# Patient Record
Sex: MALE | Race: WHITE | NOT HISPANIC OR LATINO | Employment: OTHER | URBAN - METROPOLITAN AREA
[De-identification: names, ages, dates, MRNs, and addresses within clinical notes are randomized per-mention and may not be internally consistent; named-entity substitution may affect disease eponyms.]

---

## 2017-09-05 ENCOUNTER — ALLSCRIPTS OFFICE VISIT (OUTPATIENT)
Dept: OTHER | Facility: OTHER | Age: 72
End: 2017-09-05

## 2017-09-05 DIAGNOSIS — Z12.5 ENCOUNTER FOR SCREENING FOR MALIGNANT NEOPLASM OF PROSTATE: ICD-10-CM

## 2017-09-05 DIAGNOSIS — Z13.220 ENCOUNTER FOR SCREENING FOR LIPOID DISORDERS: ICD-10-CM

## 2017-09-05 DIAGNOSIS — G25.0 ESSENTIAL TREMOR: ICD-10-CM

## 2017-09-05 DIAGNOSIS — R63.4 ABNORMAL WEIGHT LOSS: ICD-10-CM

## 2017-09-06 ENCOUNTER — TRANSCRIBE ORDERS (OUTPATIENT)
Dept: ADMINISTRATIVE | Facility: HOSPITAL | Age: 72
End: 2017-09-06

## 2017-09-06 ENCOUNTER — APPOINTMENT (OUTPATIENT)
Dept: LAB | Facility: HOSPITAL | Age: 72
End: 2017-09-06
Payer: MEDICARE

## 2017-09-06 DIAGNOSIS — G25.0 ESSENTIAL TREMOR: ICD-10-CM

## 2017-09-06 DIAGNOSIS — Z13.220 ENCOUNTER FOR SCREENING FOR LIPOID DISORDERS: ICD-10-CM

## 2017-09-06 DIAGNOSIS — R63.4 ABNORMAL WEIGHT LOSS: ICD-10-CM

## 2017-09-06 LAB
ALBUMIN SERPL BCP-MCNC: 3.3 G/DL (ref 3.5–5)
ALP SERPL-CCNC: 62 U/L (ref 46–116)
ALT SERPL W P-5'-P-CCNC: 11 U/L (ref 12–78)
ANION GAP SERPL CALCULATED.3IONS-SCNC: 7 MMOL/L (ref 4–13)
AST SERPL W P-5'-P-CCNC: 18 U/L (ref 5–45)
BILIRUB SERPL-MCNC: 0.6 MG/DL (ref 0.2–1)
BUN SERPL-MCNC: 14 MG/DL (ref 5–25)
CALCIUM SERPL-MCNC: 8.4 MG/DL (ref 8.3–10.1)
CHLORIDE SERPL-SCNC: 107 MMOL/L (ref 100–108)
CHOLEST SERPL-MCNC: 178 MG/DL (ref 50–200)
CO2 SERPL-SCNC: 28 MMOL/L (ref 21–32)
CREAT SERPL-MCNC: 1.04 MG/DL (ref 0.6–1.3)
ERYTHROCYTE [DISTWIDTH] IN BLOOD BY AUTOMATED COUNT: 13.8 % (ref 11.6–15.1)
GFR SERPL CREATININE-BSD FRML MDRD: 71 ML/MIN/1.73SQ M
GLUCOSE P FAST SERPL-MCNC: 89 MG/DL (ref 65–99)
HCT VFR BLD AUTO: 45.8 % (ref 36.5–49.3)
HDLC SERPL-MCNC: 45 MG/DL (ref 40–60)
HGB BLD-MCNC: 15.6 G/DL (ref 12–17)
LDLC SERPL CALC-MCNC: 123 MG/DL (ref 0–100)
MCH RBC QN AUTO: 30.8 PG (ref 26.8–34.3)
MCHC RBC AUTO-ENTMCNC: 34.1 G/DL (ref 31.4–37.4)
MCV RBC AUTO: 91 FL (ref 82–98)
PLATELET # BLD AUTO: 168 THOUSANDS/UL (ref 149–390)
PMV BLD AUTO: 10.1 FL (ref 8.9–12.7)
POTASSIUM SERPL-SCNC: 4.1 MMOL/L (ref 3.5–5.3)
PROT SERPL-MCNC: 6.3 G/DL (ref 6.4–8.2)
RBC # BLD AUTO: 5.06 MILLION/UL (ref 3.88–5.62)
SODIUM SERPL-SCNC: 142 MMOL/L (ref 136–145)
TRIGL SERPL-MCNC: 52 MG/DL
TSH SERPL DL<=0.05 MIU/L-ACNC: 2.12 UIU/ML (ref 0.36–3.74)
WBC # BLD AUTO: 8.21 THOUSAND/UL (ref 4.31–10.16)

## 2017-09-06 PROCEDURE — 86618 LYME DISEASE ANTIBODY: CPT

## 2017-09-06 PROCEDURE — 85027 COMPLETE CBC AUTOMATED: CPT

## 2017-09-06 PROCEDURE — 84443 ASSAY THYROID STIM HORMONE: CPT

## 2017-09-06 PROCEDURE — 80061 LIPID PANEL: CPT

## 2017-09-06 PROCEDURE — 36415 COLL VENOUS BLD VENIPUNCTURE: CPT

## 2017-09-06 PROCEDURE — 80053 COMPREHEN METABOLIC PANEL: CPT

## 2017-09-07 LAB
B BURGDOR IGG SER IA-ACNC: 0.23
B BURGDOR IGM SER IA-ACNC: 0.28

## 2017-09-12 ENCOUNTER — GENERIC CONVERSION - ENCOUNTER (OUTPATIENT)
Dept: OTHER | Facility: OTHER | Age: 72
End: 2017-09-12

## 2017-09-19 ENCOUNTER — GENERIC CONVERSION - ENCOUNTER (OUTPATIENT)
Dept: OTHER | Facility: OTHER | Age: 72
End: 2017-09-19

## 2017-09-19 ENCOUNTER — TRANSCRIBE ORDERS (OUTPATIENT)
Dept: ADMINISTRATIVE | Facility: HOSPITAL | Age: 72
End: 2017-09-19

## 2017-09-19 ENCOUNTER — APPOINTMENT (OUTPATIENT)
Dept: LAB | Facility: HOSPITAL | Age: 72
End: 2017-09-19
Payer: MEDICARE

## 2017-09-19 DIAGNOSIS — Z12.5 ENCOUNTER FOR SCREENING FOR MALIGNANT NEOPLASM OF PROSTATE: ICD-10-CM

## 2017-09-19 LAB — PSA SERPL-MCNC: 0.6 NG/ML (ref 0–4)

## 2017-09-19 PROCEDURE — 36415 COLL VENOUS BLD VENIPUNCTURE: CPT

## 2017-09-19 PROCEDURE — G0103 PSA SCREENING: HCPCS

## 2018-01-13 NOTE — RESULT NOTES
Discussion/Summary   All labs were excellent for sugar aned cholesterol  Lyme test was negative  Verified Results  (1) CBC/ PLT (NO DIFF) [id number] 10:32AM Skylar Mota Order Number: [id number]     Test Name Result Flag Reference   HEMATOCRIT 45 8 %  36 5-49 3   HEMOGLOBIN 15 6 g/dL  12 0-17 0   MCHC 34 1 g/dL  31 4-37 4   MCH 30 8 pg  26 8-34 3   MCV 91 fL  82-98   PLATELET COUNT 698 Thousands/uL  149-390   RBC COUNT 5 06 Million/uL  3 88-5 62   RDW 13 8 %  11 6-15 1   WBC COUNT 8 21 Thousand/uL  4 31-10 16   MPV 10 1 fL  8 9-12 7     (1) COMPREHENSIVE METABOLIC PANEL [id number] 24:51HU Skylar Mota Order Number: [id number]     Test Name Result Flag Reference   SODIUM 142 mmol/L  136-145   POTASSIUM 4 1 mmol/L  3 5-5 3   CHLORIDE 107 mmol/L  100-108   CARBON DIOXIDE 28 mmol/L  21-32   ANION GAP (CALC) 7 mmol/L  4-13   BLOOD UREA NITROGEN 14 mg/dL  5-25   CREATININE 1 04 mg/dL  0 60-1 30   Standardized to IDMS reference method   CALCIUM 8 4 mg/dL  8 3-10 1   BILI, TOTAL 0 60 mg/dL  0 20-1 00   ALK PHOSPHATAS 62 U/L     ALT (SGPT) 11 U/L L 12-78   Specimen collection should occur prior to Sulfasalazine administration due to the potential for falsely depressed results  AST(SGOT) 18 U/L  5-45   Specimen collection should occur prior to Sulfasalazine administration due to the potential for falsely depressed results  ALBUMIN 3 3 g/dL L 3 5-5 0   TOTAL PROTEIN 6 3 g/dL L 6 4-8 2   eGFR 71 ml/min/1 73sq m     National Kidney Disease Education Program recommendations are as follows:  GFR calculation is accurate only with a steady state creatinine  Chronic Kidney disease less than 60 ml/min/1 73 sq  meters  Kidney failure less than 15 ml/min/1 73 sq  meters  GLUCOSE FASTING 89 mg/dL  65-99   Specimen collection should occur prior to Sulfasalazine administration due to the potential for falsely depressed results   Specimen collection should occur prior to Sulfapyridine administration due to the potential for falsely elevated results  (1) LYME ANTIBODY PROFILE [facility] TO WESTERN BLOT [id number] 10:32AM Francisca Cevallosradha Order Number: [id number]     Test Name Result Flag Reference   LYME IGG 0 23  0 00-0 79   NEGATIVE(0 00-0 79)-Absence of detectable Borrelia IgG Antibodies  A negative result does not exclude the possibility of Borrelia infection  If early Lyme disease is suspected,a second sample should be collected & tested 4 weeks after initial testing  LYME IGM 0 28  0 00-0 79   NEGATIVE (0 00-0 79)-Absence of detectable Borrelia IgM antibodies  A negative result does not exclude the possibility of Borrelia infection  If early lyme disease is suspected, a second sample should be collected & tested 4 weeks after initial testing      (1) TSH WITH FT4 REFLEX [id number] 10:32AM Francisca Fernandez Order Number: [id number]     Test Name Result Flag Reference   TSH 2 124 uIU/mL  0 358-3 740   Patients undergoing fluorescein dye angiography may retain small amounts of fluorescein in the body for 48-72 hours post procedure  Samples containing fluorescein can produce falsely depressed TSH values  If the patient had this procedure,a specimen should be resubmitted post fluorescein clearance  (1) LIPID PANEL, FASTING [id number] 10:32AM Francisca Fernandez Order Number: [id number]     Test Name Result Flag Reference   CHOLESTEROL 178 mg/dL     HDL,DIRECT 45 mg/dL  40-60   Specimen collection should occur prior to Metamizole administration due to the potential for falsley depressed results  LDL CHOLESTEROL CALCULATED 123 mg/dL H 0-100   Triglyceride:        Normal ??? ??? ??? ??? ??? ??? ??? <150 mg/dl   ??? ??? ???Borderline High ??? ??? 150-199 mg/dl   ??? ??? ? ?? High ??? ??? ??? ??? ??? ??? ??? 200-499 mg/dl   ??? ??? ? ??Very High ??? ??? ??? ??? ??? >499 mg/dl      Cholesterol:       Desirable ??? ??? ??? ??? <200 mg/dl   ??? ??? Borderline High ??? 200-239 mg/dl   ??? ??? High ??? ??? ??? ??? ??? ??? >239 mg/dl      HDL Cholesterol:       High ??? ???>59 mg/dL   ??? ??? Low ??? ??? <41 mg/dL      This screening LDL is a calculated result  It does not have the accuracy of the Direct Measured LDL in the monitoring of patients with hyperlipidemia and/or statin therapy  Direct Measure LDL (LZO130) must be ordered separately in these patients  TRIGLYCERIDES 52 mg/dL  <=150   Specimen collection should occur prior to N-Acetylcysteine or Metamizole administration due to the potential for falsely depressed results          Pt 's wife OK>

## 2018-01-14 VITALS
WEIGHT: 148.2 LBS | SYSTOLIC BLOOD PRESSURE: 132 MMHG | TEMPERATURE: 98.2 F | HEART RATE: 78 BPM | DIASTOLIC BLOOD PRESSURE: 72 MMHG | BODY MASS INDEX: 22.46 KG/M2 | HEIGHT: 68 IN

## 2018-01-15 NOTE — RESULT NOTES
Discussion/Summary   PSA is excellent  Verified Results  (1) PSA (SCREEN) (Dx V76 44 Screen for Prostate Cancer) [id number] 02:07PM Steve Mcclendon Order Number: [id number]     Test Name Result Flag Reference   PROSTATE SPECIFIC ANTIGEN 0 6 ng/mL  0 0-4 0   American Urological Association Guidelines define biochemical recurrence of prostate cancer as a detectable or rising PSA value post-radical prostatectomy that is greater than or equal to 0 2 ng/mL with a second confirmatory level of greater than or equal to 0 2 ng/mL            Patients wife notified of results

## 2018-01-16 NOTE — PROGRESS NOTES
Assessment    1  Encounter for preventive health examination (V70 0) (Z00 00)   2  Benign familial tremor (333 1) (G25 0)   3  Tick bite, initial encounter (919 4,E906 4) (W57 XXXA)   4  Abnormal loss of weight (783 21) (R63 4)    Plan      Encounter for screening colonoscopy    · 2 - Jaci Mcrae MD, Michelle Pemberton  (Gastroenterology) Co-Management  *  Status: Active  Requested  for: 61Iov0247  Care Summary provided  : Yes     Health Maintenance    · *VB - Fall Risk Assessment  (Dx Z13 89 Screen for Neurologic Disorder);  Status:Complete;   Done: 91MWM9090 12:00AM   · *VB - Urinary Incontinence Screen (Dx Z13 89 Screen for UI); Status:Complete;   Done:  72XDV2396 12:00AM   · Follow-up visit in 1 year Evaluation and Treatment  Follow-up  Status: Hold For -  Scheduling  Requested for: 19XJB0998   · Stretch and warm up your muscles during the first 10 minutes , then cool down your  muscles for the last 10 minutes of exercise ; Status:Complete;   Done: 37USK7177   · The plan of care for urinary incontinence is detailed in the plan and/or discussion section  of today's note ; Status:Complete;   Done: 62QXJ7328   · These are things you can do to prevent falls in and around the home ; Status:Complete;    Done: 12Oct2017   · We recommend that you create an advance directive ; Status:Complete;   Done:  12Oct2017    (1) CBC/ PLT (NO DIFF); Status:Resulted - Requires Verification;   Done: 23YNT4849 12:00AM  ADJ:81HKJ4845; Ordered; For:Benign familial tremor; Ordered By:Osman Vega;   (1) COMPREHENSIVE METABOLIC PANEL; Status:Resulted - Requires Verification;   Done: 66OXB5282 12:00AM  Due:40Dzg3067; Ordered; For:Benign familial tremor; Ordered By:Osman Vega;   (1) LYME ANTIBODY PROFILE W/REFLEX TO WESTERN BLOT; Status:Resulted - Requires Verification;   Done: 73LFA0528 12:00AM  Due:36Fli8181; Ordered; For:Screening for hyperlipidemia; Ordered By:Vishal Vega;       Annotations              ick  (1) TSH WITH FT4 REFLEX; Status:Resulted - Requires Verification;   Done: 57SBA7548 12:00AM  GZE:11QGF0245; Ordered; For:Benign familial tremor; Ordered By:Osman Vega;   (1) LIPID PANEL, FASTING; Status:Resulted - Requires Verification;   Done: 34QUX8879 12:00AM  Due:80Uiu8720; Ordered; For:Abnormal loss of weight; Ordered By:Osman Vega;      Discussion/Summary  Impression: Initial Annual Wellness Visit  Immunizations: the patient declines the influenza vaccination, the patient declines the pneumococcal vaccination, hepatitis B vaccination series is not indicated at this time due to the patient's low risk of andrew the disease, the patient declines the Zostavax vaccine, the patient declines the Td vaccine and the patient declines the Tdap vaccine  Advance Directive Planning: not complete  Advice and education were given regarding fall risk reduction  He was referred to none  Medical Equipment/Suppliers: none  Patient Discussion: plan discussed with the patient, follow-up visit needed in one year  Self Referrals: No   Possible side effects of new medications were reviewed with the patient/guardian today  The treatment plan was reviewed with the patient/guardian  The patient/guardian understands and agrees with the treatment plan      Chief Complaint  HM      Advance Directives  Advance Directive [facility]:   The patient is not in agreement to receive the Advance Care Planning service    NO - Patient does not have an advance health care directive  Capacity/Competence: This patient has full decision making capacity for discussion of advance care planning and This patient has full decision making competency for discussion of advance care planning  History of Present Illness  HPI: Pulled a tick off of L ankle about a month ago that had a bulls eye with it  No recent illness or injury   Welcome to Medicare and Wellness Visits: The patient is being seen for the initial annual wellness visit     Medicare Screening and Risk Factors   Hospitalizations: no previous hospitalizations  Medicare Screening Tests Risk Questions   Abdominal aortic aneurysm risk assessment: none indicated  Osteoporosis risk assessment: none indicated  HIV risk assessment: none indicated  Drug and Alcohol Use: The patient is a current cigarette smoker  The patient reports occasional alcohol use  He has never used illicit drugs  Diet and Physical Activity: Current diet includes well balanced meals  He exercises daily  Exercise: walking  Mood Disorder and Cognitive Impairment Screening:   Depression screening  negative for symptoms  He denies feeling down, depressed, or hopeless over the past two weeks  He denies feeling little interest or pleasure in doing things over the past two weeks  Cognitive impairment screening: denies difficulty learning/retaining new information, denies difficulty handling complex tasks, denies difficulty with reasoning, denies difficulty with spatial ability and orientation, denies difficulty with language and denies difficulty with behavior  Functional Ability/Level of Safety: Hearing is normal bilaterally  He does not use a hearing aid  The patient is currently able to do activities of daily living without limitations, able to do instrumental activities of daily living without limitations, able to participate in social activities without limitations and able to drive without limitations  Activities of daily living details: does not need help using the phone, no transportation help needed, does not need help shopping, no meal preparation help needed, does not need help doing housework, does not need help doing laundry, does not need help managing medications and does not need help managing money     Injury History: no polypharmacy, no alcohol use, no mobility impairment, no antidepressant use, no deconditioning, no postural hypotension, no sedative use, no visual impairment, no urinary incontinence, no antihypertensive use, no cognitive impairment, up and go test was normal and no previous fall  Home safety risk factors:  no unfamiliar surroundings, no loose rugs, no poor household lighting, no uneven floors, no household clutter, grab bars in the bathroom and handrails on the stairs  Advance Directives: Advance directives: no living will, no durable power of  for health care directives and no advance directives  Co-Managers and Medical Equipment/Suppliers: See Patient Care Team   Preventive Quality Program 65 and Older: Falls Risk: The patient fell 0 times in the past 12 months  The patient is currently asymptomatic Symptoms Include:  Associated symptoms:  No associated symptoms are reported  The patient currently has no urinary incontinence symptoms  Review of Systems    Constitutional: no fever  ENT: no nasal congestion  Cardiovascular: no chest pain and no lower extremity edema  Respiratory: no shortness of breath and no wheezing  Gastrointestinal: no abdominal pain and no vomiting  Genitourinary: no dysuria  Musculoskeletal: no diffuse joint pain  Integumentary and Breasts: no rashes and no edema  Neurological: no headache  Psychiatric: no insomnia, no anxiety and no depression  Endocrine: no muscle weakness  Hematologic and Lymphatic: no tendency for easy bruising  Active Problems    1  Abdominal aortic ectasia (447 72) (I77 811)   2  Abnormal loss of weight (783 21) (R63 4)   3  Acute bronchitis (466 0) (J20 9)   4  Anorexia (783 0) (R63 0)   5  Benign familial tremor (333 1) (G25 0)   6  Community acquired pneumonia (5) (J18 9)   7  Encounter for screening colonoscopy (V76 51) (Z12 11)   8  Erectile dysfunction of non-organic origin (302 72) (F52 21)   9  Pleurisy (511 0) (R09 1)   10  Prostatic hyperplasia (600 90) (N40 0)   11   Pulmonary nodule seen on imaging study (793 11) (R91 1)    Past Medical History    · Denied: History of Alcohol abuse   · Denied: History of substance abuse   · Denied: History of Mental health problem    The active problems and past medical history were reviewed and updated today  Family History     Mother    · Denied: Family history of Alcohol abuse   · Denied: Family history of substance abuse   · Denied: Family history of Mental health problem     Father    · Denied: Family history of Alcohol abuse   · Denied: Family history of substance abuse   · Denied: Family history of Mental health problem    The family history was reviewed and updated today  Social History    · Alcohol Use (History)   · Being A Social Drinker   · Current Every Day Smoker (305 1)   · Marital History - Currently   The social history was reviewed and updated today  The social history was reviewed and is unchanged  Allergies    1  No Known Drug Allergies    Vitals  Signs    Temperature: 98 2 F, TympanicHeart Rate: 78, L RadialPulse Quality: Regular, L RadialSystolic: 496, LUE, SittingDiastolic: 72, LUE, SittingHeight: 5 ft 8 inWeight: 148 lb 3 2 ozBMI Calculated: 22 53BSA Calculated: 1 8    Physical Exam    Constitutional   General appearance: No acute distress, well appearing and well nourished  Eyes   Conjunctiva and lids: No erythema, swelling or discharge  Ears, Nose, Mouth, and Throat   External inspection of ears and nose: Normal     Otoscopic examination: Tympanic membranes translucent with normal light reflex  Canals patent without erythema  Hearing: Normal     Nasal mucosa, septum, and turbinates: Normal without edema or erythema  Lips, teeth, and gums: Normal, good dentition  Oropharynx: Normal with no erythema, edema, exudate or lesions  Neck   Neck: Supple, symmetric, trachea midline, no masses  Thyroid: Normal, no thyromegaly  Pulmonary   Respiratory effort: No increased work of breathing or signs of respiratory distress  Auscultation of lungs: Clear to auscultation      Cardiovascular   Auscultation of heart: Normal rate and rhythm, normal S1 and S2, no murmurs  Carotid pulses: 2+ bilaterally  Abdominal aorta: Normal     Peripheral vascular exam: Normal     Examination of extremities for edema and/or varicosities: Normal     Chest   Chest: Normal     Abdomen   Abdomen: Non-tender, no masses  Liver and spleen: No hepatomegaly or splenomegaly  Lymphatic   Palpation of lymph nodes in neck: No lymphadenopathy  Musculoskeletal   Gait and station: Normal     Inspection/palpation of joints, bones, and muscles: Normal     Range of motion: Normal     Skin   Skin and subcutaneous tissue: Normal without rashes or lesions  Neurologic   Reflexes: 2+ and symmetric  Psychiatric   Judgment and insight: Normal     Orientation to person, place and time: Normal     Recent and remote memory: Intact  Mood and affect: Normal        Results/Data  PHQ-9 Adolescent Depression Screening 17Ftw6056 03:54PM [facility]     Test Name Result Flag Reference   PHQ-9 Adolescent Depression Score 1     Over the last two weeks, how often have you been bothered by any of the following problems? Little interest or pleasure in doing things: Not at all - 0  Feeling down, depressed, or hopeless: Not at all - 0  Trouble falling or staying asleep, or sleeping too much: Not at all - 0  Feeling tired or having little energy: Several days - 1  Poor appetite or over eating: Not at all - 0  Feeling bad about yourself - or that you are a failure or have let yourself or your family down: Not at all - 0  Trouble concentrating on things, such as reading the newspaper or watching television: Not at all - 0  Moving or speaking so slowly that other people could have noticed   Or the opposite -  being so fidgety or restless that you have been moving around a lot more than usual: Not at all - 0  Thoughts that you would be better off dead, or of hurting yourself in some way: Not at all - 0   PHQ-9 Adolescent Depression Screening Negative PHQ-9 Difficulty Level Not difficult at all     PHQ-9 Severity Minimal Depression         Procedure    Procedure: Visual Acuity Test    Indication: routine screening  Inforrmation supplied by a Snellen chart     Results: 20/70 in the right eye without corrective device, 20/50 in the left eye without corrective device      Signatures   Electronically signed by : Najma Puente MD; Oct 12 2017  4:32PM EST                       (Author)

## 2018-01-16 NOTE — MISCELLANEOUS
Message   Recorded as Task   Date: 09/18/2017 09:49 AM, Created By: Susanne Mills   Task Name: Follow Up   Assigned To: [address]   Regarding Patient: Abdon Rankin, Status: Active   Comment:    Fartun Flynn - 18 Sep 2017 9:49 AM     TASK CREATED  CallerEnos Moritz; General Medical Question; [phone]; [phone]  Looking for his recent PSA results   Cathie Ag - 18 Sep 2017 10:19 AM     TASK REASSIGNED: Previously Assigned To [address] like they were ordered, but I do not see them documented anywhere in [employer] or in Epic  Osman Vega - 19 Sep 2017 6:57 AM     TASK REPLIED TO: Previously Assigned To Osman Vega  PSA was not done for whatever reason  He can use order on printer to get nonfasting PSA done  Cathie Ag - 19 Sep 2017 8:49 AM     TASK EDITED  Patients wife aware        Active Problems    1  Abdominal aortic ectasia (447 72) (I77 811)   2  Abnormal loss of weight (783 21) (R63 4)   3  Acute bronchitis (466 0) (J20 9)   4  Anorexia (783 0) (R63 0)   5  Benign familial tremor (333 1) (G25 0)   6  Community acquired pneumonia (5) (J18 9)   7  Encounter for prostate cancer screening (V76 44) (Z12 5)   8  Encounter for screening colonoscopy (V76 51) (Z12 11)   9  Erectile dysfunction of non-organic origin (302 72) (F52 21)   10  Pleurisy (511 0) (R09 1)   11  Prostatic hyperplasia (600 90) (N40 0)   12  Pulmonary nodule seen on imaging study (793 11) (R91 1)   13  Screening for hyperlipidemia (V77 91) (Z13 220)   14  Tick bite, initial encounter (919 4,E906 4) (W57 XXXA)    Allergies    1   No Known Drug Allergies    Signatures   Electronically signed by : Case Torres, ; Sep 19 2017  8:50AM EST                       (Author)

## 2018-08-30 ENCOUNTER — OFFICE VISIT (OUTPATIENT)
Dept: URGENT CARE | Facility: CLINIC | Age: 73
End: 2018-08-30
Payer: MEDICARE

## 2018-08-30 VITALS
OXYGEN SATURATION: 98 % | TEMPERATURE: 97.3 F | HEIGHT: 70 IN | BODY MASS INDEX: 20.41 KG/M2 | DIASTOLIC BLOOD PRESSURE: 74 MMHG | RESPIRATION RATE: 16 BRPM | HEART RATE: 75 BPM | WEIGHT: 142.6 LBS | SYSTOLIC BLOOD PRESSURE: 139 MMHG

## 2018-08-30 DIAGNOSIS — L23.7 POISON IVY DERMATITIS: Primary | ICD-10-CM

## 2018-08-30 PROCEDURE — G0463 HOSPITAL OUTPT CLINIC VISIT: HCPCS | Performed by: PHYSICIAN ASSISTANT

## 2018-08-30 PROCEDURE — 99213 OFFICE O/P EST LOW 20 MIN: CPT | Performed by: PHYSICIAN ASSISTANT

## 2018-08-30 RX ORDER — PREDNISONE 10 MG/1
TABLET ORAL
Qty: 21 TABLET | Refills: 0 | Status: SHIPPED | OUTPATIENT
Start: 2018-08-30 | End: 2019-03-20

## 2018-08-30 NOTE — PATIENT INSTRUCTIONS
Take prednisone taper as directed with food  Wash oils off of skin thoroughly after coming into contact with poison ivy  Take benadryl at bedtime as needed for itching  Cool compresses may help soothe the itching  Recheck with PCP if no improvement in 5-7 days    Poison Ivy   WHAT YOU NEED TO KNOW:   Poison ivy is a plant that can cause an itchy, uncomfortable rash on your skin  Poison ivy grows as a shrub or vine in woods, fields, and areas of thick [place]  It has 3 bright green leaves on each stem that turn red in [place]  DISCHARGE INSTRUCTIONS:   Medicines:   · Antiseptic or drying creams or ointments: These medicines may be used to dry out the rash and decrease the itching  These products may be available without a doctor's order  · Steroids: This medicine helps decrease itching and inflammation  It can be given as a cream to apply to your skin or as a pill  · Antihistamines: This medicine may help decrease itching and help you sleep  It is available without a doctor's order  · Take your medicine as directed  Contact your healthcare provider if you think your medicine is not helping or if you have side effects  Tell him of her if you are allergic to any medicine  Keep a list of the medicines, vitamins, and herbs you take  Include the amounts, and when and why you take them  Bring the list or the pill bottles to follow-up visits  Carry your medicine list with you in case of an emergency  Follow up with your healthcare provider as directed:  Write down your questions so you remember to ask them during your visits  How your poison ivy rash spreads: You cannot spread poison ivy by touching your rash or the liquid from your blisters  Poison ivy is spread only if you scratch your skin while it still has oil on it  You may think your rash is spreading because new rashes appear over a number of days   This happens because areas covered by thin skin break out in a rash first  Your face or forearms may develop a rash before thicker areas, such as the palms of your hands  Self-care:   · Keep your rash clean and dry:  Wash it with soap and water  Gently pat it dry with a clean towel  · Try not to scratch or rub your rash: This can cause your skin to become infected  · Use a compress on your rash:  Dip a clean washcloth in cool water  Wring it out and place it on your rash  Leave the washcloth on your skin for 15 minutes  Do this at least 3 times per day  · Take a cornstarch or oatmeal bath: If your rash is too large to cover with wet washcloths, take 3 or 4 cornstarch baths daily  Mix 1 pound of cornstarch with a little water to make a paste  Add the paste to a tub full of water and mix well  You may also use colloidal oatmeal in the bath water  Use lukewarm water  Avoid hot water because it may cause your itching to increase  Prevent a poison ivy rash in the future:   · Wear skin protection:  Wear long pants, a long-sleeved shirt, and gloves  Use a skin block lotion to protect your skin from poison ivy oil  You can find this at a drugstore without a prescription  · Wash clothing after possible exposure: If you think you have been near a poison ivy plant, wash the clothes you were wearing separately from other clothes  Rinse the washing machine well after you take the clothes out  Scrub boots and shoes with warm, soapy water  Dry clean items and clothing that you cannot wash in water  Poison ivy oil is sticky and can stay on surfaces for a long time  It can cause a new rash even years later  · Bathe your pet:  Use warm water and shampoo on your pet's fur  This will prevent the spread of oil to your skin, car, and home  Wear long sleeves, long pants, and gloves while washing pets or any items that may have oil on them  · Reduce exposure to poison ivy:  Do not touch plants that look like poison ivy  Keep your yard free of poison ivy   While protecting your skin, remove the plant and the roots  Place them in a plastic bag and seal the bag tightly  · Do not burn poison ivy plants: This can spread the oil through the air  If you breathe the oil into your lungs, you could have swelling and serious breathing problems  Oil that clings to the fire brenda can land on your skin and cause a rash  Contact your healthcare provider if:   · You have pus, soft yellow scabs, or tenderness on the rash  · The itching gets worse or keeps you awake at night  · The rash covers more than 1/4 of your skin or spreads to your eyes, mouth, or genital area  · The rash is not better after 2 to 3 weeks  · You have tender, swollen glands on the sides of your neck  · You have swelling in your arms and legs  · You have questions or concerns about your condition or care  Return to the emergency department if:   · You have a fever  · You have redness, swelling, and tenderness around the rash  · You have trouble breathing  © 2017 [employer]  Information is for End User's use only and may not be sold, redistributed or otherwise used for commercial purposes  All illustrations and images included in CareNotes® are the copyrighted property of [employer]  or [employer]  The above information is an  only  It is not intended as medical advice for individual conditions or treatments  Talk to your doctor, nurse or pharmacist before following any medical regimen to see if it is safe and effective for you

## 2018-08-30 NOTE — PROGRESS NOTES
[facility]        NAME: Lillie Jackson is a 68 y o  male  : 1945    MRN: [id number]  DATE: 2018  TIME: 5:37 PM    Assessment and Plan   Poison ivy dermatitis [L23 7]  1  Poison ivy dermatitis  predniSONE 10 mg tablet     Patient Instructions   Take prednisone taper as directed with food  Wash oils off of skin thoroughly after coming into contact with poison ivy  Take benadryl at bedtime as needed for itching  Cool compresses may help soothe the itching  Recheck with PCP if no improvement in 5-7 days    Chief Complaint     Chief Complaint   Patient presents with    Rash     started yesterday, working in the yard, bushes, trees, pt reports rash is possible poison ivy         History of Present Illness       67 y/o  Male was pulling weeds in his backyard yesterday then noticed an itchy rash of his arms afterwards  Rash is also on the back of his neck, and he feels itchiness above his left eye  Review of Systems   Review of Systems   Constitutional: Negative  Respiratory: Negative  Cardiovascular: Negative  Skin: Positive for rash  All other systems reviewed and are negative  Current Medications       Current Outpatient Prescriptions:     predniSONE 10 mg tablet, Day 1: take 6; day 2: take 5; day 3: take 4; day 4: take 3; day 5: take 2; Day 6: take 1 with food, Disp: 21 tablet, Rfl: 0    Current Allergies     Allergies as of 2018    (No Known Allergies)            The following portions of the patient's history were reviewed and updated as appropriate: allergies, current medications, past family history, past medical history, past social history, past surgical history and problem list    History reviewed  No pertinent past medical history    Past Surgical History:   Procedure Laterality Date    FINGER SURGERY             Objective   /74   Pulse 75   Temp (!) 97 3 °F (36 3 °C) (Tympanic)   Resp 16   Ht 5' 10" (1 778 m)   Wt 64 7 kg (142 lb 9 6 oz) SpO2 98%   BMI 20 46 kg/m²        Physical Exam     Physical Exam   Constitutional: He is oriented to person, place, and time  He appears well-developed and well-nourished  Neurological: He is alert and oriented to person, place, and time  Skin:   Scattered blotches of erythematous, maculopapular rash with pinpoint blisters noted on b/l arms and posterior aspect of neck  Psychiatric: He has a normal mood and affect  His behavior is normal    Nursing note and vitals reviewed

## 2019-03-20 ENCOUNTER — OFFICE VISIT (OUTPATIENT)
Dept: FAMILY MEDICINE CLINIC | Facility: HOSPITAL | Age: 74
End: 2019-03-20
Payer: MEDICARE

## 2019-03-20 VITALS
BODY MASS INDEX: 22.82 KG/M2 | TEMPERATURE: 96.4 F | HEART RATE: 65 BPM | WEIGHT: 145.4 LBS | SYSTOLIC BLOOD PRESSURE: 124 MMHG | HEIGHT: 67 IN | DIASTOLIC BLOOD PRESSURE: 70 MMHG | OXYGEN SATURATION: 96 %

## 2019-03-20 DIAGNOSIS — R39.198 SLOWING OF URINARY STREAM: Primary | ICD-10-CM

## 2019-03-20 PROCEDURE — 99213 OFFICE O/P EST LOW 20 MIN: CPT | Performed by: FAMILY MEDICINE

## 2019-03-20 RX ORDER — TAMSULOSIN HYDROCHLORIDE 0.4 MG/1
0.4 CAPSULE ORAL
Qty: 15 CAPSULE | Refills: 1 | Status: SHIPPED | OUTPATIENT
Start: 2019-03-20

## 2019-03-20 NOTE — PROGRESS NOTES
Assessment/Plan:         Diagnoses and all orders for this visit:    Slowing of urinary stream  Comments:  Low normal PSA at last check  Consider urethral stenosis  If no benefit with Tamsulosin, needs Urology evaluation with cystoscopy  Orders:  -     tamsulosin (FLOMAX) 0 4 mg; Take 1 capsule (0 4 mg total) by mouth daily with dinner          Subjective:      Patient ID: Shalom Harper is a 68 y o  male  Having slow stream over the past year  Urine stream is very narrowed    Never feels like he is empty  Nocturia usually once a night    No dysuria and no pain    Bowels are sometimes constipated      The following portions of the patient's history were reviewed and updated as appropriate: allergies, current medications, past family history, past medical history, past social history, past surgical history and problem list     Review of Systems   Respiratory: Negative  Cardiovascular: Negative  Genitourinary: Positive for difficulty urinating, frequency and urgency  Negative for decreased urine volume, scrotal swelling and testicular pain  Objective:      /70 (Patient Position: Sitting, Cuff Size: Standard)   Pulse 65   Temp (!) 96 4 °F (35 8 °C) (Tympanic)   Ht 5' 7" (1 702 m)   Wt 66 kg (145 lb 6 4 oz)   SpO2 96%   BMI 22 77 kg/m²          Physical Exam   Constitutional: He is oriented to person, place, and time  Cardiovascular: Normal rate, regular rhythm, normal heart sounds and intact distal pulses  Pulmonary/Chest: Effort normal and breath sounds normal    Musculoskeletal: Normal range of motion  Neurological: He is oriented to person, place, and time  Skin: No rash noted

## 2020-07-27 ENCOUNTER — TELEPHONE (OUTPATIENT)
Dept: FAMILY MEDICINE CLINIC | Facility: HOSPITAL | Age: 75
End: 2020-07-27

## 2020-07-28 ENCOUNTER — TELEPHONE (OUTPATIENT)
Dept: FAMILY MEDICINE CLINIC | Facility: HOSPITAL | Age: 75
End: 2020-07-28

## 2020-07-28 PROBLEM — R63.4 WEIGHT LOSS, UNINTENTIONAL: Status: ACTIVE | Noted: 2020-07-28

## 2020-07-28 PROBLEM — G44.52 NEW DAILY PERSISTENT HEADACHE: Status: ACTIVE | Noted: 2020-07-28

## 2020-07-28 PROBLEM — R42 VERTIGO: Status: ACTIVE | Noted: 2020-07-28

## 2020-07-29 ENCOUNTER — APPOINTMENT (OUTPATIENT)
Dept: LAB | Facility: HOSPITAL | Age: 75
End: 2020-07-29
Payer: COMMERCIAL

## 2020-07-29 DIAGNOSIS — R63.4 WEIGHT LOSS, UNINTENTIONAL: ICD-10-CM

## 2020-07-29 DIAGNOSIS — R42 VERTIGO: ICD-10-CM

## 2020-07-29 LAB
ALBUMIN SERPL BCP-MCNC: 3.1 G/DL (ref 3.5–5)
ALP SERPL-CCNC: 75 U/L (ref 46–116)
ALT SERPL W P-5'-P-CCNC: 15 U/L (ref 12–78)
ANION GAP SERPL CALCULATED.3IONS-SCNC: 4 MMOL/L (ref 4–13)
AST SERPL W P-5'-P-CCNC: 18 U/L (ref 5–45)
BASOPHILS # BLD AUTO: 0.07 THOUSANDS/ΜL (ref 0–0.1)
BASOPHILS NFR BLD AUTO: 1 % (ref 0–1)
BILIRUB SERPL-MCNC: 0.43 MG/DL (ref 0.2–1)
BUN SERPL-MCNC: 20 MG/DL (ref 5–25)
CALCIUM SERPL-MCNC: 9.2 MG/DL (ref 8.3–10.1)
CHLORIDE SERPL-SCNC: 107 MMOL/L (ref 100–108)
CO2 SERPL-SCNC: 29 MMOL/L (ref 21–32)
CREAT SERPL-MCNC: 1.03 MG/DL (ref 0.6–1.3)
EOSINOPHIL # BLD AUTO: 0.38 THOUSAND/ΜL (ref 0–0.61)
EOSINOPHIL NFR BLD AUTO: 4 % (ref 0–6)
ERYTHROCYTE [DISTWIDTH] IN BLOOD BY AUTOMATED COUNT: 12 % (ref 11.6–15.1)
GFR SERPL CREATININE-BSD FRML MDRD: 71 ML/MIN/1.73SQ M
GLUCOSE SERPL-MCNC: 73 MG/DL (ref 65–140)
HCT VFR BLD AUTO: 44 % (ref 36.5–49.3)
HGB BLD-MCNC: 14.7 G/DL (ref 12–17)
IMM GRANULOCYTES # BLD AUTO: 0.03 THOUSAND/UL (ref 0–0.2)
IMM GRANULOCYTES NFR BLD AUTO: 0 % (ref 0–2)
LYMPHOCYTES # BLD AUTO: 2.55 THOUSANDS/ΜL (ref 0.6–4.47)
LYMPHOCYTES NFR BLD AUTO: 26 % (ref 14–44)
MCH RBC QN AUTO: 30.9 PG (ref 26.8–34.3)
MCHC RBC AUTO-ENTMCNC: 33.4 G/DL (ref 31.4–37.4)
MCV RBC AUTO: 93 FL (ref 82–98)
MONOCYTES # BLD AUTO: 0.59 THOUSAND/ΜL (ref 0.17–1.22)
MONOCYTES NFR BLD AUTO: 6 % (ref 4–12)
NEUTROPHILS # BLD AUTO: 6.1 THOUSANDS/ΜL (ref 1.85–7.62)
NEUTS SEG NFR BLD AUTO: 63 % (ref 43–75)
NRBC BLD AUTO-RTO: 0 /100 WBCS
PLATELET # BLD AUTO: 216 THOUSANDS/UL (ref 149–390)
PMV BLD AUTO: 11.6 FL (ref 8.9–12.7)
POTASSIUM SERPL-SCNC: 4.2 MMOL/L (ref 3.5–5.3)
PROT SERPL-MCNC: 6.7 G/DL (ref 6.4–8.2)
RBC # BLD AUTO: 4.75 MILLION/UL (ref 3.88–5.62)
SODIUM SERPL-SCNC: 140 MMOL/L (ref 136–145)
TSH SERPL DL<=0.05 MIU/L-ACNC: 2.36 UIU/ML (ref 0.36–3.74)
WBC # BLD AUTO: 9.72 THOUSAND/UL (ref 4.31–10.16)

## 2020-07-29 PROCEDURE — 84443 ASSAY THYROID STIM HORMONE: CPT

## 2020-07-29 PROCEDURE — 36415 COLL VENOUS BLD VENIPUNCTURE: CPT

## 2020-07-29 PROCEDURE — 85025 COMPLETE CBC W/AUTO DIFF WBC: CPT

## 2020-07-29 PROCEDURE — 86618 LYME DISEASE ANTIBODY: CPT

## 2020-07-29 PROCEDURE — 80053 COMPREHEN METABOLIC PANEL: CPT

## 2020-07-30 LAB — B BURGDOR IGG+IGM SER-ACNC: <0.91 ISR (ref 0–0.9)

## 2020-07-31 ENCOUNTER — TELEPHONE (OUTPATIENT)
Dept: FAMILY MEDICINE CLINIC | Facility: HOSPITAL | Age: 75
End: 2020-07-31

## 2020-07-31 NOTE — TELEPHONE ENCOUNTER
All labs excellent for sugar, liver, kidney, thyroid and negative lyme  No anemia or signs of low iron  Protein is a little low so the supplements with protein are good  (Ensure,Boost,etc)

## 2020-08-02 ENCOUNTER — APPOINTMENT (EMERGENCY)
Dept: CT IMAGING | Facility: HOSPITAL | Age: 75
End: 2020-08-02
Payer: COMMERCIAL

## 2020-08-02 ENCOUNTER — NURSE TRIAGE (OUTPATIENT)
Dept: OTHER | Facility: OTHER | Age: 75
End: 2020-08-02

## 2020-08-02 ENCOUNTER — HOSPITAL ENCOUNTER (EMERGENCY)
Facility: HOSPITAL | Age: 75
Discharge: HOME | End: 2020-08-02
Attending: EMERGENCY MEDICINE | Admitting: EMERGENCY MEDICINE
Payer: COMMERCIAL

## 2020-08-02 VITALS
WEIGHT: 135 LBS | SYSTOLIC BLOOD PRESSURE: 125 MMHG | HEIGHT: 67 IN | BODY MASS INDEX: 21.19 KG/M2 | OXYGEN SATURATION: 98 % | RESPIRATION RATE: 20 BRPM | TEMPERATURE: 97.5 F | HEART RATE: 69 BPM | DIASTOLIC BLOOD PRESSURE: 63 MMHG

## 2020-08-02 DIAGNOSIS — C34.92 PRIMARY MALIGNANT NEOPLASM OF LEFT LUNG METASTATIC TO OTHER SITE (HCC): Primary | ICD-10-CM

## 2020-08-02 DIAGNOSIS — R27.0 ATAXIA: ICD-10-CM

## 2020-08-02 DIAGNOSIS — C79.31 BRAIN METASTASES (HCC): ICD-10-CM

## 2020-08-02 LAB
ANION GAP SERPL CALCULATED.3IONS-SCNC: 5 MMOL/L (ref 4–13)
BASOPHILS # BLD AUTO: 0.04 THOUSANDS/ΜL (ref 0–0.1)
BASOPHILS NFR BLD AUTO: 1 % (ref 0–1)
BUN SERPL-MCNC: 22 MG/DL (ref 5–25)
CALCIUM SERPL-MCNC: 8.8 MG/DL (ref 8.3–10.1)
CHLORIDE SERPL-SCNC: 104 MMOL/L (ref 100–108)
CO2 SERPL-SCNC: 30 MMOL/L (ref 21–32)
CREAT SERPL-MCNC: 1.05 MG/DL (ref 0.6–1.3)
EOSINOPHIL # BLD AUTO: 0.21 THOUSAND/ΜL (ref 0–0.61)
EOSINOPHIL NFR BLD AUTO: 3 % (ref 0–6)
ERYTHROCYTE [DISTWIDTH] IN BLOOD BY AUTOMATED COUNT: 12 % (ref 11.6–15.1)
GFR SERPL CREATININE-BSD FRML MDRD: 69 ML/MIN/1.73SQ M
GLUCOSE SERPL-MCNC: 98 MG/DL (ref 65–140)
HCT VFR BLD AUTO: 44.8 % (ref 36.5–49.3)
HGB BLD-MCNC: 15 G/DL (ref 12–17)
IMM GRANULOCYTES # BLD AUTO: 0.02 THOUSAND/UL (ref 0–0.2)
IMM GRANULOCYTES NFR BLD AUTO: 0 % (ref 0–2)
LYMPHOCYTES # BLD AUTO: 2.14 THOUSANDS/ΜL (ref 0.6–4.47)
LYMPHOCYTES NFR BLD AUTO: 25 % (ref 14–44)
MCH RBC QN AUTO: 30.9 PG (ref 26.8–34.3)
MCHC RBC AUTO-ENTMCNC: 33.5 G/DL (ref 31.4–37.4)
MCV RBC AUTO: 92 FL (ref 82–98)
MONOCYTES # BLD AUTO: 0.42 THOUSAND/ΜL (ref 0.17–1.22)
MONOCYTES NFR BLD AUTO: 5 % (ref 4–12)
NEUTROPHILS # BLD AUTO: 5.7 THOUSANDS/ΜL (ref 1.85–7.62)
NEUTS SEG NFR BLD AUTO: 66 % (ref 43–75)
NRBC BLD AUTO-RTO: 0 /100 WBCS
PLATELET # BLD AUTO: 202 THOUSANDS/UL (ref 149–390)
PMV BLD AUTO: 9.8 FL (ref 8.9–12.7)
POTASSIUM SERPL-SCNC: 4.3 MMOL/L (ref 3.5–5.3)
RBC # BLD AUTO: 4.86 MILLION/UL (ref 3.88–5.62)
SODIUM SERPL-SCNC: 139 MMOL/L (ref 136–145)
WBC # BLD AUTO: 8.53 THOUSAND/UL (ref 4.31–10.16)

## 2020-08-02 PROCEDURE — 71260 CT THORAX DX C+: CPT

## 2020-08-02 PROCEDURE — 99284 EMERGENCY DEPT VISIT MOD MDM: CPT | Performed by: PHYSICIAN ASSISTANT

## 2020-08-02 PROCEDURE — 74177 CT ABD & PELVIS W/CONTRAST: CPT

## 2020-08-02 PROCEDURE — 99284 EMERGENCY DEPT VISIT MOD MDM: CPT

## 2020-08-02 PROCEDURE — 80048 BASIC METABOLIC PNL TOTAL CA: CPT | Performed by: PHYSICIAN ASSISTANT

## 2020-08-02 PROCEDURE — 70450 CT HEAD/BRAIN W/O DYE: CPT

## 2020-08-02 PROCEDURE — 36415 COLL VENOUS BLD VENIPUNCTURE: CPT | Performed by: PHYSICIAN ASSISTANT

## 2020-08-02 PROCEDURE — G1004 CDSM NDSC: HCPCS

## 2020-08-02 PROCEDURE — 85025 COMPLETE CBC W/AUTO DIFF WBC: CPT | Performed by: PHYSICIAN ASSISTANT

## 2020-08-02 PROCEDURE — 96374 THER/PROPH/DIAG INJ IV PUSH: CPT

## 2020-08-02 RX ORDER — DEXAMETHASONE SODIUM PHOSPHATE 10 MG/ML
10 INJECTION, SOLUTION INTRAMUSCULAR; INTRAVENOUS ONCE
Status: COMPLETED | OUTPATIENT
Start: 2020-08-02 | End: 2020-08-02

## 2020-08-02 RX ORDER — DEXAMETHASONE 6 MG/1
6 TABLET ORAL 2 TIMES DAILY WITH MEALS
Qty: 30 TABLET | Refills: 0 | Status: SHIPPED | OUTPATIENT
Start: 2020-08-02

## 2020-08-02 RX ADMIN — IOHEXOL 100 ML: 350 INJECTION, SOLUTION INTRAVENOUS at 14:00

## 2020-08-02 RX ADMIN — DEXAMETHASONE SODIUM PHOSPHATE 10 MG: 10 INJECTION, SOLUTION INTRAMUSCULAR; INTRAVENOUS at 15:16

## 2020-08-02 NOTE — ED PROVIDER NOTES
History  Chief Complaint   Patient presents with    Headache     patient presents to the ED with c/o persistent headache and "feeling like hes drunk"  states he has a scheduled MRI wednesday but symptoms have worsened     Dizziness     80-year-old male of [place] descent with history of COPD presents emergency department for evaluation of persistent daily headaches associated with gait abnormality over the past 2 weeks  Patient states he feels drunk when he walks and has to hold onto nearby objects or else he will fall  He also endorses approximately 30 lb weight loss over the past several months, unintentional   He denies any blurry or double vision, no photophobia, no neck pain, denies nausea or vomiting, denies chest pain or shortness of breath  He is a approximately 40 pack year smoker, denies any significant family history medical problems  He was evaluated by his primary care physician this week for the symptoms, at which time he was given a scopolamine patch which has not helped  He is scheduled to undergo a brain MRI in 3 days  On exam, patient is thin and cachectic appearing but is pleasant and in no distress  Cardiopulmonary exam is benign  Cranial nerve exam is unremarkable with no deficits  The patient has no limb ataxia and bilateral upper and lower extremity strength and sensation is intact in all fields and symmetric  Patient is able to ambulate under his own power however appears to have significant truncal ataxia, frequently requiring him to grab onto nearby objects  He is unable to perform tandem gait  A/P:  Ataxia  Concern for neoplastic disease, also consider demyelinating syndrome versus prior stroke  Will obtain basic labs and CT head, consider further follow-up imaging or consultation as warranted          Prior to Admission Medications   Prescriptions Last Dose Informant Patient Reported?  Taking?   scopolamine (TRANSDERM-SCOP) 1 5 mg/3 days TD 72 hr patch   No No   Sig: Place 1 patch on the skin every third day   tamsulosin (FLOMAX) 0 4 mg   No No   Sig: Take 1 capsule (0 4 mg total) by mouth daily with dinner   Patient not taking: Reported on 7/28/2020      Facility-Administered Medications: None       Past Medical History:   Diagnosis Date    Emphysema of lung ([facility] [address] )        Past Surgical History:   Procedure Laterality Date    FINGER SURGERY         History reviewed  No pertinent family history  I have reviewed and agree with the history as documented  E-Cigarette/Vaping    E-Cigarette Use Never User      E-Cigarette/Vaping Substances     Social History     Tobacco Use    Smoking status: Current Every Day Smoker    Smokeless tobacco: Never Used   Substance Use Topics    Alcohol use: Yes     Comment: Social     Drug use: Never       Review of Systems   Constitutional: Positive for unexpected weight change  Negative for chills, diaphoresis and fever  Eyes: Negative for visual disturbance  Respiratory: Negative for cough and shortness of breath  Cardiovascular: Negative for chest pain and palpitations  Gastrointestinal: Negative for abdominal pain, diarrhea, nausea and vomiting  Genitourinary: Negative for dysuria, flank pain and frequency  Musculoskeletal: Positive for gait problem  Negative for arthralgias and myalgias  Skin: Negative for color change, rash and wound  Allergic/Immunologic: Negative for immunocompromised state  Neurological: Positive for headaches  Negative for dizziness, weakness, light-headedness and numbness  Hematological: Does not bruise/bleed easily  Psychiatric/Behavioral: Negative for confusion  The patient is not nervous/anxious  Physical Exam  Physical Exam  Vitals signs and nursing note reviewed  Constitutional:       General: He is not in acute distress  Appearance: He is underweight  He is not diaphoretic  HENT:      Head: Normocephalic and atraumatic        Mouth/Throat:      Mouth: Mucous membranes are moist    Eyes:      General: No scleral icterus  Pupils: Pupils are equal, round, and reactive to light  Neck:      Vascular: No JVD  Cardiovascular:      Rate and Rhythm: Normal rate and regular rhythm  Heart sounds: No murmur  No friction rub  No gallop  Pulmonary:      Effort: No respiratory distress  Breath sounds: No wheezing or rales  Skin:     General: Skin is warm and dry  Neurological:      Mental Status: He is alert and oriented to person, place, and time  GCS: GCS eye subscore is 4  GCS verbal subscore is 5  GCS motor subscore is 6  Cranial Nerves: Cranial nerves are intact  Sensory: Sensation is intact  Motor: Motor function is intact  Coordination: Romberg sign positive   Finger-Nose-Finger Test and Heel to [facility] Test normal       Gait: Gait abnormal and tandem walk abnormal          Vital Signs  ED Triage Vitals   Temperature Pulse Respirations Blood Pressure SpO2   08/02/20 1212 08/02/20 1212 08/02/20 1212 08/02/20 1215 08/02/20 1212   97 5 °F (36 4 °C) 69 20 125/63 98 %      Temp Source Heart Rate Source Patient Position - Orthostatic VS BP Location FiO2 (%)   08/02/20 1212 08/02/20 1212 -- -- --   Temporal Monitor         Pain Score       --                  Vitals:    08/02/20 1212 08/02/20 1215   BP:  125/63   Pulse: 69          Visual Acuity  Visual Acuity      Most Recent Value   L Pupil Size (mm)  3   R Pupil Size (mm)  3          ED Medications  Medications   iohexol (OMNIPAQUE) 350 MG/ML injection (MULTI-DOSE) 100 mL (100 mL Intravenous Given 8/2/20 1400)   dexamethasone (PF) (DECADRON) injection 10 mg (10 mg Intravenous Given 8/2/20 1516)       Diagnostic Studies  Results Reviewed     Procedure Component Value Units Date/Time    Basic metabolic panel [[id number]] Collected:  08/02/20 1233    Lab Status:  Final result Specimen:  Blood from Arm, Right Updated:  08/02/20 1255     Sodium 139 mmol/L      Potassium 4 3 mmol/L      Chloride 104 mmol/L CO2 30 mmol/L      ANION GAP 5 mmol/L      BUN 22 mg/dL      Creatinine 1 05 mg/dL      Glucose 98 mg/dL      Calcium 8 8 mg/dL      eGFR 69 ml/min/1 73sq m     Narrative:       [place] guidelines for Chronic Kidney Disease (CKD):     Stage 1 with normal or high GFR (GFR > 90 mL/min/1 73 square meters)    Stage 2 Mild CKD (GFR = 60-89 mL/min/1 73 square meters)    Stage 3A Moderate CKD (GFR = 45-59 mL/min/1 73 square meters)    Stage 3B Moderate CKD (GFR = 30-44 mL/min/1 73 square meters)    Stage 4 Severe CKD (GFR = 15-29 mL/min/1 73 square meters)    Stage 5 End Stage CKD (GFR <15 mL/min/1 73 square meters)  Note: GFR calculation is accurate only with a steady state creatinine    CBC and differential [[id number]] Collected:  08/02/20 1233    Lab Status:  Final result Specimen:  Blood from Arm, Right Updated:  08/02/20 1246     WBC 8 53 Thousand/uL      RBC 4 86 Million/uL      Hemoglobin 15 0 g/dL      Hematocrit 44 8 %      MCV 92 fL      MCH 30 9 pg      MCHC 33 5 g/dL      RDW 12 0 %      MPV 9 8 fL      Platelets 697 Thousands/uL      nRBC 0 /100 WBCs      Neutrophils Relative 66 %      Immat GRANS % 0 %      Lymphocytes Relative 25 %      Monocytes Relative 5 %      Eosinophils Relative 3 %      Basophils Relative 1 %      Neutrophils Absolute 5 70 Thousands/µL      Immature Grans Absolute 0 02 Thousand/uL      Lymphocytes Absolute 2 14 Thousands/µL      Monocytes Absolute 0 42 Thousand/µL      Eosinophils Absolute 0 21 Thousand/µL      Basophils Absolute 0 04 Thousands/µL                  CT chest abdomen pelvis w contrast   Final Result by Ghanshyam Treviño MD (08/02 5503)      1  Large left lower lobe heterogeneously enhancing pulmonary mass consistent with primary carcinoma in a patient with previously characterized intracranial metastases  Mediastinal and left hilar adenopathy as above is presumed metastatic     2   Bilateral subcentimeter pulmonary nodules as above are presumed metastatic  Based on current Fleischner Society 2017 Guidelines on incidental pulmonary nodule, patients with a known malignancy are at increased risk of metastasis and should receive    initial three month follow-up chest CT  3   COPD  The study was marked in EPIC for significant notification  Workstation performed: [id number]         CT head without contrast   Final Result by Kya Chan MD (08/02 1313)      Multiple areas of vasogenic edema, most notably within the bilateral parietal lobes as well as within the right cerebellar hemisphere with cystic lesion and vasogenic edema in the right cerebellar hemisphere causing mass effect on the 4th ventricle  Remaining ventricles are unremarkable and there is no transmigration of CSF  Findings are most suspicious for metastatic disease and further workup with brain MRI recommended  I personally discussed this study with Isabellaperla Coffey on 8/2/2020 at 1:13 PM                Workstation performed: [id number]                    Procedures  Procedures         ED Course  ED Course as of Aug 02 1534   Sun Aug 02, 2020   1326 Pt informed of CT results  Will obtain CT chest/abd/pelvis to evaluate for primary tumor  Plan to discuss [address] [place] [address] findings with neurosurgery, although at this time the patient has no desire to be admitted to the hospital      [phone] Pt is adamant that he does not wish to stay in hospital  I have educated him and his family regarding the risk of signing out AMA today, which include seizure, brain herniation, death  They will discuss as a family          US AUDIT      Most Recent Value   Initial Alcohol Screen: US AUDIT-C    1  How often do you have a drink containing alcohol?  0 Filed at: 08/02/2020 1213   2  How many drinks containing alcohol do you have on a typical day you are drinking? 0 Filed at: 08/02/2020 1213   3a  Male UNDER 65: How often do you have five or more drinks on one occasion?   0 Filed at: 08/02/2020 1213   3b  FEMALE Any Age, or MALE 65+: How often do you have 4 or more drinks on one occassion? 0 Filed at: 08/02/2020 1213   Audit-C Score  0 Filed at: 08/02/2020 1213                  MIKKI/DAST-10      Most Recent Value   How many times in the past year have you    Used an illegal drug or used a prescription medication for non-medical reasons? 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 [facility]): new and requires workup  Diagnosis management comments: Unfortunately this 76 old gentleman was found to metastatic lung cancer with multiple brain Mets resulting in vasogenic edema  This is the source of his ataxia and headaches  I educated the patient as well as his spouse and daughter regarding the need for transfer to [facility] for Neurosurgery consultation and urgent MRI, however after discussion the patient chooses to sign out against medical advice  He states that he has no desire to stay overnight in the hospital   He is fully alert and oriented and expresses complete understanding of the gravity of his situation  Spouse and daughter also convey understanding that lack of treatment may result in brain herniation, death, seizure, or bodily injury secondary to ataxia    I will provide him with a prescription for dexamethasone at discharge and he is strongly encouraged to follow-up with neurosurgery, and is again encouraged to reconsider his decision and return to the hospital for admission       Amount and/or Complexity of Data Reviewed  Clinical lab tests: ordered and reviewed  Tests in the radiology section of CPT®: ordered and reviewed  Tests in the medicine section of CPT®: ordered and reviewed  Decide to obtain previous medical records or to obtain history from someone other than the patient: yes  Obtain history from someone other than the patient: yes  Review and summarize past medical records: yes  Discuss the patient with other providers: yes  Independent visualization of images, tracings, or specimens: yes          Disposition  Final diagnoses:   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 [facility])   Brain metastases ([facility] [address] )   Ataxia     Time reflects when diagnosis was documented in both MDM as applicable and the Disposition within this note     Time User Action Codes Description Comment    8/2/2020  3:08 PM Maudry  Add [C34 92] 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 [facility])     8/2/2020  3:08 PM Maudry  Add [C79 31] Brain metastases ([facility] [address] )     8/2/2020  3:08 PM Maudry  Add [R27 0] Ataxia       ED Disposition     ED Disposition Condition Date/Time Comment    [facility] Aug 2, 2020  3:07 PM Date: 8/2/2020  Patient: Steve Bangura  Admitted: 8/2/2020 12:08 PM  Attending Provider: Maximilian Licea DO    Steve Bangura or his authorized caregiver has made the decision for the patient to leave the emergency department against the advice of h is attending physician  He or his authorized caregiver has been informed and understands the inherent risks, including death, seizure, stroke, severe injury  He or his authorized caregiver has decided to accept the responsibility for this decision  Steve Bangura and all necessary parties have been advised that he may return for further evaluation or treatment  His condition at time of discharge was stable    Steve Bangura had current vital signs as follows:  /63   Pulse 69   Temp 97 5  °F (36 4 °C) (Temporal)   Resp 20   Ht 5' 7"   Wt 61 2 kg (135 lb)         Follow-up Information     Follow up With Specialties Details Why Contact Info Additional [address] Neurosurgery Call in 1 day  [address]  [place] [phone]  [facility] [address] [facility], [place], [place], [address] [phone]          Discharge Medication List as of 8/2/2020  3:11 PM      START taking these medications    Details   dexamethasone (DECADRON) 6 mg tablet Take 1 tablet (6 mg total) by mouth 2 (two) times a day with meals, Starting Sun 8/2/2020, Normal         CONTINUE these medications which have NOT CHANGED    Details   scopolamine (TRANSDERM-SCOP) 1 5 mg/3 days TD 72 hr patch Place 1 patch on the skin every third day, Starting Tue 7/28/2020, Normal      tamsulosin (FLOMAX) 0 4 mg Take 1 capsule (0 4 mg total) by mouth daily with dinner, Starting Wed 3/20/2019, Normal           No discharge procedures on file      PDMP Review     None          ED Provider  Electronically Signed by           Shaji Villagomez PA-C  08/02/20 3167

## 2020-08-02 NOTE — TELEPHONE ENCOUNTER
Regarding: Headaches/loss of balance  ----- Message from George Larios sent at 8/2/2020 10:35 AM EDT -----  "My 's headaches are getting worse  His equilibrium is off  He is not really eating for drinking   He is not schedule for a MRI until Wednesday "

## 2020-08-02 NOTE — DISCHARGE INSTRUCTIONS
You have elected to sign out against medical advice as opposed to hospital admission for further management  Please reconsider your decision return to the emergency department for hospital admission and further treatment  The risk declining treatment is worsening brain swelling which may result in seizure or even death  He has acknowledged that this is a risk and still elected to sign out against medical advice    Please contact the Neurosurgery office 1st thing tomorrow morning and continue with the plans to obtain your MRI this week 114

## 2020-08-02 NOTE — TELEPHONE ENCOUNTER
Reason for Disposition   Patient sounds very sick or weak to the triager   Headache  (and neurologic deficit)    Answer Assessment - Initial Assessment Questions  1  LOCATION: "Where does it hurt?"       Left side of the head  2  ONSET: "When did the headache start?" (Minutes, hours or days)       Started last weekend but balance was off prior to headaches  3  PATTERN: "Does the pain come and go, or has it been constant since it started?"      Headache is intermittent  4  SEVERITY: "How bad is the pain?" and "What does it keep you from doing?"  (e g , Scale 1-10; mild, moderate, or severe)    - MILD (1-3): doesn't interfere with normal activities     - MODERATE (4-7): interferes with normal activities or awakens from sleep     - SEVERE (8-10): excruciating pain, unable to do any normal activities         4 presently, at worst, 6/7  5  RECURRENT SYMPTOM: "Have you ever had headaches before?" If so, ask: "When was the last time?" and "What happened that time?"       "No he never gets HA's"  6  CAUSE: "What do you think is causing the headache?"      "No, not really "  7  MIGRAINE: "Have you been diagnosed with migraine headaches?" If so, ask: "Is this headache similar?"       Never diagnosed  8  HEAD INJURY: "Has there been any recent injury to the head?"       Denies  9  OTHER SYMPTOMS: "Do you have any other symptoms?" (fever, stiff neck, eye pain, sore throat, cold symptoms)      No fever, no vision or eye pain  Balance is what is getting worse, has to hold onto something to walk  Answer Assessment - Initial Assessment Questions  1  SYMPTOM: "What is the main symptom you are concerned about?" (e g , weakness, numbness)      Gait is unsteady  2  ONSET: "When did this start?" (minutes, hours, days; while sleeping)      Approximately near 2 weeks  3  LAST NORMAL: "When was the last time you were normal (no symptoms)?"      Same time  4   PATTERN "Does this come and go, or has it been constant since it started?"  "Is it present now?"      Constant, becoming worse  5  CARDIAC SYMPTOMS: "Have you had any of the following symptoms: chest pain, difficulty breathing, palpitations?"      Denies  6   NEUROLOGIC SYMPTOMS: "Have you had any of the following symptoms: headache, dizziness, vision loss, double vision, changes in speech, unsteady on your feet?"      HA  7  OTHER SYMPTOMS: "Do you have any other symptoms?"    Protocols used: HEADACHE-ADULT-, NEUROLOGIC DEFICIT-ADULT-AH

## 2020-08-02 NOTE — TELEPHONE ENCOUNTER
Spoke with Lowell Boyle and we discussed worsening of symptoms  Lowell Boyle does agree that patient should be evaluated in the ER today and should not wait  Called wife back and we discussed  Wife stated that she was going to call her daughter who is a nurse to see where she should bring him  I advised that even though we prefer he use [facility], going to the ER as soon as possible is what's best for him  Wife verbalized understanding and stated that she would bring him

## 2020-08-03 ENCOUNTER — TRANSCRIBE ORDERS (OUTPATIENT)
Dept: NEUROSURGERY | Facility: CLINIC | Age: 75
End: 2020-08-03

## 2020-08-03 DIAGNOSIS — R90.89 ABNORMAL CT OF BRAIN: Primary | ICD-10-CM

## 2020-12-22 DIAGNOSIS — B37.0 THRUSH OF MOUTH AND ESOPHAGUS (HCC): Primary | ICD-10-CM

## 2020-12-22 DIAGNOSIS — Z51.5 HOSPICE CARE PATIENT: ICD-10-CM

## 2020-12-22 DIAGNOSIS — B37.81 THRUSH OF MOUTH AND ESOPHAGUS (HCC): Primary | ICD-10-CM
